# Patient Record
Sex: FEMALE | Race: BLACK OR AFRICAN AMERICAN | NOT HISPANIC OR LATINO | Employment: STUDENT | URBAN - METROPOLITAN AREA
[De-identification: names, ages, dates, MRNs, and addresses within clinical notes are randomized per-mention and may not be internally consistent; named-entity substitution may affect disease eponyms.]

---

## 2023-05-09 ENCOUNTER — TELEPHONE (OUTPATIENT)
Dept: PRIMARY CARE | Facility: CLINIC | Age: 23
End: 2023-05-09

## 2023-06-05 ENCOUNTER — OFFICE VISIT (OUTPATIENT)
Dept: PRIMARY CARE | Facility: CLINIC | Age: 23
End: 2023-06-05
Payer: COMMERCIAL

## 2023-06-05 VITALS
TEMPERATURE: 98.2 F | BODY MASS INDEX: 19.29 KG/M2 | HEIGHT: 62 IN | SYSTOLIC BLOOD PRESSURE: 106 MMHG | WEIGHT: 104.8 LBS | DIASTOLIC BLOOD PRESSURE: 64 MMHG

## 2023-06-05 DIAGNOSIS — E16.2 HYPOGLYCEMIA: Primary | ICD-10-CM

## 2023-06-05 PROBLEM — J45.909 ASTHMA (HHS-HCC): Status: ACTIVE | Noted: 2023-06-05

## 2023-06-05 PROBLEM — J30.9 ALLERGIC RHINITIS: Status: ACTIVE | Noted: 2023-06-05

## 2023-06-05 PROBLEM — K92.1 HEMATOCHEZIA: Status: ACTIVE | Noted: 2023-06-05

## 2023-06-05 PROBLEM — E55.9 VITAMIN D INSUFFICIENCY: Status: ACTIVE | Noted: 2023-06-05

## 2023-06-05 PROBLEM — R00.2 PALPITATION: Status: ACTIVE | Noted: 2023-06-05

## 2023-06-05 PROBLEM — R42 LIGHTHEADEDNESS: Status: ACTIVE | Noted: 2023-06-05

## 2023-06-05 PROBLEM — R06.02 SHORTNESS OF BREATH: Status: ACTIVE | Noted: 2023-06-05

## 2023-06-05 PROCEDURE — 99213 OFFICE O/P EST LOW 20 MIN: CPT | Performed by: STUDENT IN AN ORGANIZED HEALTH CARE EDUCATION/TRAINING PROGRAM

## 2023-06-05 PROCEDURE — 1036F TOBACCO NON-USER: CPT | Performed by: STUDENT IN AN ORGANIZED HEALTH CARE EDUCATION/TRAINING PROGRAM

## 2023-06-05 RX ORDER — DEXTROSE 15 G/33 G
GEL IN PACKET (GRAM) ORAL
COMMUNITY
Start: 2022-06-06 | End: 2023-12-15

## 2023-06-05 RX ORDER — LANCETS 33 GAUGE
EACH MISCELLANEOUS
COMMUNITY
Start: 2022-06-06

## 2023-06-05 RX ORDER — IBUPROFEN 600 MG/1
TABLET ORAL
COMMUNITY
Start: 2022-08-08

## 2023-06-05 RX ORDER — MONTELUKAST SODIUM 10 MG/1
TABLET ORAL
COMMUNITY
Start: 2022-05-10 | End: 2024-04-03 | Stop reason: SDUPTHER

## 2023-06-05 RX ORDER — BLOOD SUGAR DIAGNOSTIC
STRIP MISCELLANEOUS
COMMUNITY
Start: 2022-06-06

## 2023-06-05 RX ORDER — BUDESONIDE 180 UG/1
AEROSOL, POWDER RESPIRATORY (INHALATION)
COMMUNITY
Start: 2022-12-20 | End: 2023-12-15

## 2023-06-05 RX ORDER — ALBUTEROL SULFATE 90 UG/1
AEROSOL, METERED RESPIRATORY (INHALATION)
COMMUNITY
Start: 2022-05-10

## 2023-06-05 ASSESSMENT — PATIENT HEALTH QUESTIONNAIRE - PHQ9
2. FEELING DOWN, DEPRESSED OR HOPELESS: NOT AT ALL
1. LITTLE INTEREST OR PLEASURE IN DOING THINGS: NOT AT ALL
SUM OF ALL RESPONSES TO PHQ9 QUESTIONS 1 & 2: 0

## 2023-06-05 ASSESSMENT — LIFESTYLE VARIABLES
HOW OFTEN DO YOU HAVE SIX OR MORE DRINKS ON ONE OCCASION: NEVER
HOW OFTEN DO YOU HAVE A DRINK CONTAINING ALCOHOL: MONTHLY OR LESS
HOW MANY STANDARD DRINKS CONTAINING ALCOHOL DO YOU HAVE ON A TYPICAL DAY: 1 OR 2
AUDIT-C TOTAL SCORE: 1
SKIP TO QUESTIONS 9-10: 1

## 2023-06-05 NOTE — PATIENT INSTRUCTIONS
BMI was below normal measurement. Current weight: 47.5 kg (104 lb 12.8 oz)  Weight change since last visit (-) denotes wt loss -5.2 lbs   Weight gain needed to achieve  BMI of 18.5 = 3.9 Lbs    Instructed patient to consume high calorie, high protein diet.  Glucose tolerance test ordered.   Return in August

## 2023-06-05 NOTE — PROGRESS NOTES
Subjective   Patient ID: Kacie Avery is a pleasant 23 y.o. female who presents for Follow-up.  HPI  Patient was seen in the ER on 5/3 for tachycardia, diarrhea, anemia.  She reports that she had a hellen sigmoidoscopy on June 2 noted to have internal hemorrhoids.  She was advised to use Benefiber and MiraLAX.  Denies any significant melena or hematochezia.    Concerned about hypoglycemia  She had an episode couple weeks ago. She was feeling and lightheaded and shaky. She states she wasn't eating for the whole day.   We had previously checked her for diabetes C-peptide and insulin level were normal.  Reports that her grandmother is diabetic unsure if type I or type II.  Review of Systems   All other systems reviewed and are negative.      Visit Vitals  /64   Temp 36.8 °C (98.2 °F)          Objective   Physical Exam  Constitutional:       General: She is not in acute distress.     Appearance: Normal appearance.   HENT:      Head: Normocephalic and atraumatic.   Eyes:      General: No scleral icterus.     Conjunctiva/sclera: Conjunctivae normal.   Cardiovascular:      Rate and Rhythm: Normal rate and regular rhythm.      Heart sounds: Normal heart sounds.   Pulmonary:      Effort: Pulmonary effort is normal.      Breath sounds: Normal breath sounds. No wheezing.   Abdominal:      General: Bowel sounds are normal. There is no distension.      Palpations: Abdomen is soft.      Tenderness: There is no abdominal tenderness.   Musculoskeletal:      Cervical back: Neck supple.      Right lower leg: No edema.      Left lower leg: No edema.   Lymphadenopathy:      Cervical: No cervical adenopathy.   Skin:     General: Skin is warm and dry.   Neurological:      General: No focal deficit present.      Mental Status: She is alert and oriented to person, place, and time.   Psychiatric:         Mood and Affect: Mood normal.         Behavior: Behavior normal.         Assessment/Plan   Problem List Items Addressed This Visit           Endocrine/Metabolic    Hypoglycemia - Primary    Relevant Orders    Hemoglobin A1c    Glucose Tolerance, 2 hour with Insulin response (Non-pregnancy)

## 2023-08-07 ENCOUNTER — OFFICE VISIT (OUTPATIENT)
Dept: PRIMARY CARE | Facility: CLINIC | Age: 23
End: 2023-08-07
Payer: COMMERCIAL

## 2023-08-07 VITALS
RESPIRATION RATE: 14 BRPM | HEIGHT: 62 IN | OXYGEN SATURATION: 99 % | SYSTOLIC BLOOD PRESSURE: 110 MMHG | HEART RATE: 93 BPM | WEIGHT: 114.2 LBS | TEMPERATURE: 97 F | DIASTOLIC BLOOD PRESSURE: 68 MMHG | BODY MASS INDEX: 21.02 KG/M2

## 2023-08-07 DIAGNOSIS — Z01.419 WELL WOMAN EXAM: ICD-10-CM

## 2023-08-07 DIAGNOSIS — Z00.00 ANNUAL PHYSICAL EXAM: Primary | ICD-10-CM

## 2023-08-07 DIAGNOSIS — J45.20 MILD INTERMITTENT ASTHMA WITHOUT COMPLICATION (HHS-HCC): ICD-10-CM

## 2023-08-07 DIAGNOSIS — Z23 NEED FOR TDAP VACCINATION: ICD-10-CM

## 2023-08-07 PROCEDURE — 99395 PREV VISIT EST AGE 18-39: CPT | Performed by: STUDENT IN AN ORGANIZED HEALTH CARE EDUCATION/TRAINING PROGRAM

## 2023-08-07 PROCEDURE — 90715 TDAP VACCINE 7 YRS/> IM: CPT | Performed by: STUDENT IN AN ORGANIZED HEALTH CARE EDUCATION/TRAINING PROGRAM

## 2023-08-07 PROCEDURE — 1036F TOBACCO NON-USER: CPT | Performed by: STUDENT IN AN ORGANIZED HEALTH CARE EDUCATION/TRAINING PROGRAM

## 2023-08-07 PROCEDURE — 90471 IMMUNIZATION ADMIN: CPT | Performed by: STUDENT IN AN ORGANIZED HEALTH CARE EDUCATION/TRAINING PROGRAM

## 2023-08-07 ASSESSMENT — LIFESTYLE VARIABLES
HOW MANY STANDARD DRINKS CONTAINING ALCOHOL DO YOU HAVE ON A TYPICAL DAY: PATIENT DOES NOT DRINK
SKIP TO QUESTIONS 9-10: 1
HOW OFTEN DO YOU HAVE A DRINK CONTAINING ALCOHOL: NEVER
AUDIT-C TOTAL SCORE: 0
HOW OFTEN DO YOU HAVE SIX OR MORE DRINKS ON ONE OCCASION: NEVER

## 2023-08-07 ASSESSMENT — PATIENT HEALTH QUESTIONNAIRE - PHQ9
1. LITTLE INTEREST OR PLEASURE IN DOING THINGS: NOT AT ALL
2. FEELING DOWN, DEPRESSED OR HOPELESS: NOT AT ALL
SUM OF ALL RESPONSES TO PHQ9 QUESTIONS 1 & 2: 0

## 2023-08-07 NOTE — PROGRESS NOTES
Subjective   Patient ID: Kacie Avery is a pleasant 23 y.o. female who presents for Annual Exam.  HPI    HM:   Blood work reviewed  Need pap smear. Referral to GYN  UTD with HPV.   Due for Tdap vaccine.    Review of Systems   All other systems reviewed and are negative.      Visit Vitals  /68   Pulse 93   Temp 36.1 °C (97 °F)   Resp 14          Objective   Physical Exam  Constitutional:       General: She is not in acute distress.     Appearance: Normal appearance.   HENT:      Head: Normocephalic and atraumatic.   Eyes:      General: No scleral icterus.     Conjunctiva/sclera: Conjunctivae normal.   Cardiovascular:      Rate and Rhythm: Normal rate and regular rhythm.      Heart sounds: Normal heart sounds.   Pulmonary:      Effort: Pulmonary effort is normal.      Breath sounds: Normal breath sounds. No wheezing.   Abdominal:      General: Bowel sounds are normal. There is no distension.      Palpations: Abdomen is soft.      Tenderness: There is no abdominal tenderness.   Musculoskeletal:      Cervical back: Neck supple.      Right lower leg: No edema.      Left lower leg: No edema.   Lymphadenopathy:      Cervical: No cervical adenopathy.   Skin:     General: Skin is warm and dry.   Neurological:      General: No focal deficit present.      Mental Status: She is alert and oriented to person, place, and time.   Psychiatric:         Mood and Affect: Mood normal.         Behavior: Behavior normal.         Assessment/Plan   Problem List Items Addressed This Visit       Asthma    Relevant Orders    Referral to Pulmonology     Other Visit Diagnoses       Annual physical exam    -  Primary    Well woman exam        Relevant Orders    Referral to Obstetrics / Gynecology    Need for Tdap vaccination

## 2023-12-11 NOTE — PROGRESS NOTES
Patient: Kacie Avery    [id number]  : 2000 -- AGE 23 y.o.    Provider: Bee VAUGHN- [facility]     Location [facility]   Service Date: 12/15/23          [facility]  New Visit Note    HISTORY OF PRESENT ILLNESS     The patient's referring provider is: Tatyana Che MD    HISTORY OF PRESENT ILLNESS   Kacie Avery is a 23 y.o. female who is a never smoker, who presents to a [facility] for an initial evaluation for Mild intermittent asthma without complication .     I have independently interviewed and examined the patient in the office and reviewed available records.    Current History    On today's visit, the patient reports she wanted to establish care with pulmonology since her pulmonologist moved. She was diagnosed with asthma last year after she was having chest tightness. She was given and albuterol inhaler that she has never needed to use, states she does not want to take medication if she does not have to. She takes montelukast daily. She reports an occasional wheeze, chest tightness and short of breath but recovers quickly. She denies COLE and ER visits for breathing issues. 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     Previous pulmonary history: Asthma.    Inhalers/nebulized medications: albuterol.    Hospitalization History: He has not been hospitalized over the last year for breathing related problem.    Sleep history: Denies snoring, apnea, feeling tired during the day or taking naps during the day.       ALLERGIES AND MEDICATIONS     ALLERGIES  No Known Allergies    MEDICATIONS  Current Outpatient Medications   Medication Sig Dispense Refill    albuterol 90 mcg/actuation inhaler Inhale.      dextrose 15 gram/33 gram gel in packet Take by mouth.      ibuprofen 600 mg tablet Take by mouth.      lancets 33 gauge misc USE AS DIRECTED.      montelukast (Singulair) 10 mg tablet Take  by mouth.      OneTouch Ultra Test strip Use to test blood sugars 1 times daily as directed (dispense covered meter and strips, DX E11.65)      Pulmicort Flexhaler 180 mcg/actuation inhaler Inhale.      sodium phosphates 19-7 gram/197 mL enema Insert into the rectum once daily.       No current facility-administered medications for this visit.         PAST HISTORY     PAST MEDICAL HISTORY  Asthma  Allergic rhinitis    PAST SURGICAL HISTORY  Past Surgical History:   Procedure Laterality Date    OTHER SURGICAL HISTORY  05/10/2022    No history of surgery       IMMUNIZATION HISTORY  Immunization History   Administered Date(s) Administered    HPV 9-valent vaccine (GARDASIL 9) 07/20/2016    HPV, Quadrivalent 09/11/2014, 05/19/2015    Hepatitis B vaccine, pediatric/adolescent (RECOMBIVAX, ENGERIX) 2002, 2002, 01/09/2003    Hib (HbOC) 2002, 2002, 01/09/2003, 09/05/2003    Influenza Whole 12/23/2003    Influenza, live, intranasal, quadrivalent 09/11/2014    Influenza, seasonal, injectable 11/24/2020    Meningococcal B vaccine (BEXSERO) 08/13/2019, 12/22/2020    Meningococcal MCV4, Unspecified 09/05/2013    Meningococcal MCV4P 08/13/2019    Tdap vaccine, age 7 year and older (BOOSTRIX) 08/07/2023       SOCIAL HISTORY  Tobacco Smoking: never  Illicit drugs: never  Alcohol consumption: no  Pets: dog    OCCUPATIONAL/ENVIRONMENTAL HISTORY  Occupation: Student in [place], works at [employer] in [place]  No known exposure to asbestos, silica, beryllium or inhaled metals.  No exposure to birds or exotic animals.    FAMILY HISTORY  Brother - Asthma   Cousin -Asthma  Maternal grandmother- pulmonary sarcoidosis  No family history of cancer.      REVIEW OF SYSTEMS     REVIEW OF SYSTEMS  Review of Systems    Constitutional: No fever, no chills, no night sweats.    Eyes: No double vision, no floaters, no dry eyes.   ENT: See HPI.   Neck: No neck stiffness.  Cardiovascular: No sharp chest pain, no heart racing, no  leg swelling.  Respiratory: as noted in HPI.   Integumentary: No rashes or sores.  Neurological: No dizziness, no headaches. Sleeping well.  Psychiatric: No mood changes.     PHYSICAL EXAM     VITAL SIGNS:   Vitals:    12/15/23 1356   BP: 99/65   Pulse: 77   Resp: 18   Temp: 36.9 °C (98.5 °F)   SpO2: 98%     CURRENT WEIGHT: Body mass index is 21.07 kg/m².    PREVIOUS WEIGHTS:  Wt Readings from Last 3 Encounters:   08/07/23 51.8 kg (114 lb 3.2 oz)   06/05/23 47.5 kg (104 lb 12.8 oz)   06/02/23 50 kg (110 lb 3.7 oz)       Physical Exam    Constitutional: General appearance: Alert and oriented.  No acute distress. Well developed, well nourished.  Head and face: Symmetric  Pulmonary: Chest is normal. No increased work of breathing or signs of respiratory distress. Clear to auscultation bilaterally - no crackles, wheezing, or rhonchi.   Cardiovascular: Heart rate and rhythm normal. Normal S1, S2 - no murmurs, gallops, or pericardial rub.   Abdomen: Soft, non tender, +BS  Extremities: No edema. No clubbing or cyanosis of the fingernails.    Neurologic: Moves all four extremities   MSK: Normal movements of extremities. Gait normal   Psychiatric: Intact judgement and insight.    RESULTS/DATA     Pulmonary Function Test Results     6/2022: FEV1/FVC 0.68 /FEV1 2.24 (83%)/FVC 3.31 (83%)/ TLC-N2 4.89 (117%)/ RV-N2 129%/ DLCO 102%      Chest Radiograph     XR chest 2 view 05/03/2023    Narrative  Interpreted By:  VIJAYA ELENA MD  MRN: [id number]  Patient Name: TOM COKER    STUDY:   CHEST 2 VIEW PA AND LAT;  5/3/2023 5:14 pm    INDICATION:  dyspnea, tachycardia .    COMPARISON:  07/04/2022    ACCESSION NUMBER(S):  [id number]    ORDERING CLINICIAN:  MARCIA EPPERSON    FINDINGS:    The cardiac silhouette is unremarkable. Costophrenic angles are  sharp. Lungs are clear. The trachea is midline. There is no  pneumothorax. No acute osseous abnormality is seen.    Impression  1.  No active cardiopulmonary process.      Chest CT Scan  "    No testing.      Echocardiogram     No testing.       Labwork   Complete Blood Count  Lab Results   Component Value Date    WBC 7.1 05/03/2023    HGB 13.3 05/03/2023    HCT 41.6 05/03/2023    MCV 88 05/03/2023     05/03/2023       Peripheral Eosinophil Count/Percentage:   Eosinophils Absolute (x10E9/L)   Date Value   05/03/2023 0.06     Eosinophils % (%)   Date Value   05/03/2023 0.8       Serum Immunoglobulin E:    No results found for: \"IGE\"     ASSESSMENT/PLAN   Ms. Avery is a 23 y.o. female, who is a never smoker, who presents to a [facility] for an initial evaluation for Mild intermittent asthma without complication .     Problem List and Orders  Problem List Items Addressed This Visit       Asthma       Assessment and Plan / Recommendations:  Problem List Items Addressed This Visit    None    Asthma; mild intermittent- well controlled  - continue albuterol HFA every 4-6 hours as needed for shortness of breath        Follow up in 12 months or sooner if needed.    If you have any questions please call the office [phone]    Thank you for visiting the Pulmonary clinic today!   Bee Wheat CNP  [phone]   "

## 2023-12-15 ENCOUNTER — OFFICE VISIT (OUTPATIENT)
Dept: PULMONOLOGY | Facility: CLINIC | Age: 23
End: 2023-12-15
Payer: COMMERCIAL

## 2023-12-15 VITALS
RESPIRATION RATE: 18 BRPM | TEMPERATURE: 98.5 F | SYSTOLIC BLOOD PRESSURE: 99 MMHG | OXYGEN SATURATION: 98 % | HEART RATE: 77 BPM | BODY MASS INDEX: 21.2 KG/M2 | DIASTOLIC BLOOD PRESSURE: 65 MMHG | WEIGHT: 115.2 LBS | HEIGHT: 62 IN

## 2023-12-15 DIAGNOSIS — J45.20 MILD INTERMITTENT ASTHMA WITHOUT COMPLICATION (HHS-HCC): ICD-10-CM

## 2023-12-15 PROCEDURE — 1036F TOBACCO NON-USER: CPT

## 2023-12-15 PROCEDURE — 99204 OFFICE O/P NEW MOD 45 MIN: CPT

## 2023-12-15 ASSESSMENT — ASTHMA QUESTIONNAIRES
QUESTION_2 LAST FOUR WEEKS HOW OFTEN HAVE YOU HAD SHORTNESS OF BREATH: 1 OR 2 TIMES PER WEEK
QUESTION_3 LAST FOUR WEEKS HOW OFTEN DID YOUR ASTHMA SYMPTOMS (WHEEZING, COUGHING, SHORTNESS OF BREATH, CHEST TIGHTNESS OR PAIN) WAKE YOU UP AT NIGHT OR EARLIER THAN USUAL IN THE MORNING: NOT AT ALL
QUESTION_1 LAST FOUR WEEKS HOW MUCH OF THE TIME DID YOUR ASTHMA KEEP YOU FROM GETTING AS MUCH DONE AT WORK, SCHOOL OR AT HOME: A LITTLE OF THE TIME
QUESTION_4 LAST FOUR WEEKS HOW OFTEN HAVE YOU USED YOUR RESCUE INHALER OR NEBULIZER MEDICATION (SUCH AS ALBUTEROL): NOT AT ALL
ACT_TOTALSCORE: 22
QUESTION_5 LAST FOUR WEEKS HOW WOULD YOU RATE YOUR ASTHMA CONTROL: WELL CONTROLLED

## 2023-12-15 ASSESSMENT — PATIENT HEALTH QUESTIONNAIRE - PHQ9
2. FEELING DOWN, DEPRESSED OR HOPELESS: NOT AT ALL
1. LITTLE INTEREST OR PLEASURE IN DOING THINGS: NOT AT ALL
SUM OF ALL RESPONSES TO PHQ9 QUESTIONS 1 AND 2: 0

## 2023-12-15 ASSESSMENT — COLUMBIA-SUICIDE SEVERITY RATING SCALE - C-SSRS
2. HAVE YOU ACTUALLY HAD ANY THOUGHTS OF KILLING YOURSELF?: NO
6. HAVE YOU EVER DONE ANYTHING, STARTED TO DO ANYTHING, OR PREPARED TO DO ANYTHING TO END YOUR LIFE?: NO
1. IN THE PAST MONTH, HAVE YOU WISHED YOU WERE DEAD OR WISHED YOU COULD GO TO SLEEP AND NOT WAKE UP?: NO

## 2023-12-15 ASSESSMENT — ENCOUNTER SYMPTOMS
OCCASIONAL FEELINGS OF UNSTEADINESS: 0
LOSS OF SENSATION IN FEET: 0
DEPRESSION: 0

## 2024-04-03 ENCOUNTER — TELEPHONE (OUTPATIENT)
Dept: PULMONOLOGY | Facility: CLINIC | Age: 24
End: 2024-04-03
Payer: COMMERCIAL

## 2024-04-03 DIAGNOSIS — J45.20 MILD INTERMITTENT ASTHMA WITHOUT COMPLICATION (HHS-HCC): Primary | ICD-10-CM

## 2024-04-03 RX ORDER — MONTELUKAST SODIUM 10 MG/1
10 TABLET ORAL NIGHTLY
Qty: 30 TABLET | Refills: 6 | Status: SHIPPED | OUTPATIENT
Start: 2024-04-03 | End: 2024-10-30

## 2024-05-09 ENCOUNTER — TELEPHONE (OUTPATIENT)
Dept: PULMONOLOGY | Facility: CLINIC | Age: 24
End: 2024-05-09
Payer: COMMERCIAL

## 2024-12-17 ENCOUNTER — APPOINTMENT (OUTPATIENT)
Dept: PULMONOLOGY | Facility: CLINIC | Age: 24
End: 2024-12-17
Payer: COMMERCIAL